# Patient Record
Sex: FEMALE | Race: BLACK OR AFRICAN AMERICAN | NOT HISPANIC OR LATINO | Employment: OTHER | URBAN - METROPOLITAN AREA
[De-identification: names, ages, dates, MRNs, and addresses within clinical notes are randomized per-mention and may not be internally consistent; named-entity substitution may affect disease eponyms.]

---

## 2021-05-07 ENCOUNTER — CLINICAL SUPPORT (OUTPATIENT)
Dept: URGENT CARE | Facility: CLINIC | Age: 71
End: 2021-05-07
Payer: MEDICARE

## 2021-05-07 DIAGNOSIS — Z11.52 ENCOUNTER FOR SCREENING LABORATORY TESTING FOR COVID-19 VIRUS: Primary | ICD-10-CM

## 2021-05-07 DIAGNOSIS — U07.1 COVID-19 VIRUS DETECTED: ICD-10-CM

## 2021-05-07 LAB
CTP QC/QA: YES
SARS-COV-2 RDRP RESP QL NAA+PROBE: POSITIVE

## 2021-05-07 PROCEDURE — U0002: ICD-10-PCS | Mod: QW,S$GLB,, | Performed by: PHYSICIAN ASSISTANT

## 2021-05-07 PROCEDURE — U0002 COVID-19 LAB TEST NON-CDC: HCPCS | Mod: QW,S$GLB,, | Performed by: PHYSICIAN ASSISTANT

## 2021-05-25 ENCOUNTER — CLINICAL SUPPORT (OUTPATIENT)
Dept: URGENT CARE | Facility: CLINIC | Age: 71
End: 2021-05-25
Payer: MEDICARE

## 2021-05-25 DIAGNOSIS — Z20.822 ENCOUNTER FOR LABORATORY TESTING FOR COVID-19 VIRUS: ICD-10-CM

## 2021-05-25 PROCEDURE — U0003 INFECTIOUS AGENT DETECTION BY NUCLEIC ACID (DNA OR RNA); SEVERE ACUTE RESPIRATORY SYNDROME CORONAVIRUS 2 (SARS-COV-2) (CORONAVIRUS DISEASE [COVID-19]), AMPLIFIED PROBE TECHNIQUE, MAKING USE OF HIGH THROUGHPUT TECHNOLOGIES AS DESCRIBED BY CMS-2020-01-R: HCPCS | Performed by: PHYSICIAN ASSISTANT

## 2021-05-25 PROCEDURE — U0005 INFEC AGEN DETEC AMPLI PROBE: HCPCS | Performed by: PHYSICIAN ASSISTANT

## 2021-05-26 ENCOUNTER — TELEPHONE (OUTPATIENT)
Dept: URGENT CARE | Facility: CLINIC | Age: 71
End: 2021-05-26

## 2021-05-26 LAB — SARS-COV-2 RNA RESP QL NAA+PROBE: DETECTED

## 2021-05-28 ENCOUNTER — PATIENT OUTREACH (OUTPATIENT)
Dept: INFECTIOUS DISEASES | Facility: HOSPITAL | Age: 71
End: 2021-05-28

## 2021-05-28 ENCOUNTER — NURSE TRIAGE (OUTPATIENT)
Dept: ADMINISTRATIVE | Facility: CLINIC | Age: 71
End: 2021-05-28

## 2024-08-20 ENCOUNTER — CLINICAL SUPPORT (OUTPATIENT)
Dept: CARDIAC REHAB | Facility: HOSPITAL | Age: 74
End: 2024-08-20
Attending: INTERNAL MEDICINE
Payer: MEDICARE

## 2024-08-20 DIAGNOSIS — J44.9 COPD (CHRONIC OBSTRUCTIVE PULMONARY DISEASE): Primary | ICD-10-CM

## 2024-08-20 PROCEDURE — 93797 PHYS/QHP OP CAR RHAB WO ECG: CPT

## 2024-10-03 ENCOUNTER — CLINICAL SUPPORT (OUTPATIENT)
Dept: CARDIAC REHAB | Facility: HOSPITAL | Age: 74
End: 2024-10-03
Payer: MEDICARE

## 2024-10-03 DIAGNOSIS — J44.9 COPD (CHRONIC OBSTRUCTIVE PULMONARY DISEASE): Primary | ICD-10-CM

## 2024-10-03 PROCEDURE — 94626 PHY/QHP OP PULM RHB W/MNTR: CPT

## 2025-03-23 ENCOUNTER — HOSPITAL ENCOUNTER (EMERGENCY)
Facility: HOSPITAL | Age: 75
Discharge: HOME | End: 2025-03-23
Attending: EMERGENCY MEDICINE
Payer: MEDICARE

## 2025-03-23 VITALS
TEMPERATURE: 98 F | DIASTOLIC BLOOD PRESSURE: 79 MMHG | OXYGEN SATURATION: 91 % | RESPIRATION RATE: 18 BRPM | WEIGHT: 117 LBS | SYSTOLIC BLOOD PRESSURE: 166 MMHG | BODY MASS INDEX: 20.73 KG/M2 | HEART RATE: 93 BPM | HEIGHT: 63 IN

## 2025-03-23 DIAGNOSIS — R31.9 HEMATURIA, UNSPECIFIED TYPE: ICD-10-CM

## 2025-03-23 DIAGNOSIS — J18.9 PNEUMONIA OF RIGHT MIDDLE LOBE DUE TO INFECTIOUS ORGANISM: ICD-10-CM

## 2025-03-23 DIAGNOSIS — R10.9 RIGHT FLANK PAIN: Primary | ICD-10-CM

## 2025-03-23 DIAGNOSIS — Z87.09 HISTORY OF COPD: ICD-10-CM

## 2025-03-23 DIAGNOSIS — Z87.09 HX OF INFLUENZA: ICD-10-CM

## 2025-03-23 LAB
ABSOLUTE EOSINOPHIL (SMH): 0.13 K/UL
ABSOLUTE MONOCYTE (SMH): 0.67 K/UL (ref 0.3–1)
ABSOLUTE NEUTROPHIL COUNT (SMH): 3.8 K/UL (ref 1.8–7.7)
ALBUMIN SERPL-MCNC: 3.3 G/DL (ref 3.5–5.2)
ALP SERPL-CCNC: 107 UNIT/L (ref 40–150)
ALT SERPL-CCNC: <8 UNIT/L (ref 10–44)
ANION GAP (SMH): 12 MMOL/L (ref 8–16)
AST SERPL-CCNC: 13 UNIT/L (ref 11–45)
BACTERIA #/AREA URNS AUTO: ABNORMAL /HPF
BASOPHILS # BLD AUTO: 0.02 K/UL
BASOPHILS NFR BLD AUTO: 0.3 %
BILIRUB SERPL-MCNC: 0.4 MG/DL (ref 0.1–1)
BILIRUB UR QL STRIP.AUTO: NEGATIVE
BUN SERPL-MCNC: 18 MG/DL (ref 8–23)
CALCIUM SERPL-MCNC: 9 MG/DL (ref 8.7–10.5)
CHLORIDE SERPL-SCNC: 107 MMOL/L (ref 95–110)
CLARITY UR: CLEAR
CO2 SERPL-SCNC: 23 MMOL/L (ref 23–29)
COLOR UR AUTO: YELLOW
CREAT SERPL-MCNC: 0.9 MG/DL (ref 0.5–1.4)
ERYTHROCYTE [DISTWIDTH] IN BLOOD BY AUTOMATED COUNT: 14.4 % (ref 11.5–14.5)
GFR SERPLBLD CREATININE-BSD FMLA CKD-EPI: >60 ML/MIN/1.73/M2
GLUCOSE SERPL-MCNC: 106 MG/DL (ref 70–110)
GLUCOSE UR QL STRIP: NEGATIVE
HCT VFR BLD AUTO: 38.5 % (ref 37–48.5)
HCV AB SERPL QL IA: NORMAL
HGB BLD-MCNC: 11.8 GM/DL (ref 12–16)
HGB UR QL STRIP: ABNORMAL
HIV 1+2 AB+HIV1 P24 AG SERPL QL IA: NORMAL
IMM GRANULOCYTES # BLD AUTO: 0.01 K/UL (ref 0–0.04)
IMM GRANULOCYTES NFR BLD AUTO: 0.2 % (ref 0–0.5)
KETONES UR QL STRIP: ABNORMAL
LACTATE SERPL-SCNC: 0.9 MMOL/L (ref 0.5–2.2)
LEUKOCYTE ESTERASE UR QL STRIP: ABNORMAL
LIPASE SERPL-CCNC: 41 U/L (ref 4–60)
LYMPHOCYTES # BLD AUTO: 1.63 K/UL (ref 1–4.8)
MCH RBC QN AUTO: 27.1 PG (ref 27–31)
MCHC RBC AUTO-ENTMCNC: 30.6 G/DL (ref 32–36)
MCV RBC AUTO: 89 FL (ref 82–98)
MICROSCOPIC COMMENT: ABNORMAL
NITRITE UR QL STRIP: NEGATIVE
NUCLEATED RBC (/100WBC) (SMH): 0 /100 WBC
PH UR STRIP: 6 [PH]
PLATELET # BLD AUTO: 484 K/UL (ref 150–450)
PMV BLD AUTO: 9.2 FL (ref 9.2–12.9)
POTASSIUM SERPL-SCNC: 3.2 MMOL/L (ref 3.5–5.1)
PROT SERPL-MCNC: 7.4 GM/DL (ref 6–8.4)
PROT UR QL STRIP: ABNORMAL
RBC # BLD AUTO: 4.35 M/UL (ref 4–5.4)
RBC #/AREA URNS AUTO: 84 /HPF
RELATIVE EOSINOPHIL (SMH): 2.1 % (ref 0–8)
RELATIVE LYMPHOCYTE (SMH): 26.1 % (ref 18–48)
RELATIVE MONOCYTE (SMH): 10.7 % (ref 4–15)
RELATIVE NEUTROPHIL (SMH): 60.6 % (ref 38–73)
SODIUM SERPL-SCNC: 142 MMOL/L (ref 136–145)
SP GR UR STRIP: 1.02
SQUAMOUS #/AREA URNS AUTO: 6 /HPF
UROBILINOGEN UR STRIP-ACNC: NEGATIVE EU/DL
WBC # BLD AUTO: 6.25 K/UL (ref 3.9–12.7)
WBC #/AREA URNS AUTO: 5 /HPF

## 2025-03-23 PROCEDURE — 83690 ASSAY OF LIPASE: CPT | Performed by: PHYSICIAN ASSISTANT

## 2025-03-23 PROCEDURE — 81001 URINALYSIS AUTO W/SCOPE: CPT | Performed by: PHYSICIAN ASSISTANT

## 2025-03-23 PROCEDURE — 63700000 PHARM REV CODE 250 ALT 637 W/O HCPCS: Performed by: PHYSICIAN ASSISTANT

## 2025-03-23 PROCEDURE — 80053 COMPREHEN METABOLIC PANEL: CPT | Performed by: PHYSICIAN ASSISTANT

## 2025-03-23 PROCEDURE — 94760 N-INVAS EAR/PLS OXIMETRY 1: CPT

## 2025-03-23 PROCEDURE — 36415 COLL VENOUS BLD VENIPUNCTURE: CPT | Performed by: PHYSICIAN ASSISTANT

## 2025-03-23 PROCEDURE — 86803 HEPATITIS C AB TEST: CPT | Performed by: EMERGENCY MEDICINE

## 2025-03-23 PROCEDURE — 99285 EMERGENCY DEPT VISIT HI MDM: CPT | Mod: 25

## 2025-03-23 PROCEDURE — 96375 TX/PRO/DX INJ NEW DRUG ADDON: CPT

## 2025-03-23 PROCEDURE — 25000242 PHARM REV CODE 250 ALT 637 W/ HCPCS: Performed by: PHYSICIAN ASSISTANT

## 2025-03-23 PROCEDURE — 85025 COMPLETE CBC W/AUTO DIFF WBC: CPT | Performed by: PHYSICIAN ASSISTANT

## 2025-03-23 PROCEDURE — 25000003 PHARM REV CODE 250: Performed by: PHYSICIAN ASSISTANT

## 2025-03-23 PROCEDURE — 83605 ASSAY OF LACTIC ACID: CPT | Performed by: PHYSICIAN ASSISTANT

## 2025-03-23 PROCEDURE — 94640 AIRWAY INHALATION TREATMENT: CPT

## 2025-03-23 PROCEDURE — 96361 HYDRATE IV INFUSION ADD-ON: CPT

## 2025-03-23 PROCEDURE — 87389 HIV-1 AG W/HIV-1&-2 AB AG IA: CPT | Performed by: EMERGENCY MEDICINE

## 2025-03-23 PROCEDURE — 63600175 PHARM REV CODE 636 W HCPCS: Mod: TB,JZ | Performed by: PHYSICIAN ASSISTANT

## 2025-03-23 PROCEDURE — 96374 THER/PROPH/DIAG INJ IV PUSH: CPT

## 2025-03-23 RX ORDER — AMOXICILLIN AND CLAVULANATE POTASSIUM 875; 125 MG/1; MG/1
1 TABLET, FILM COATED ORAL
Status: COMPLETED | OUTPATIENT
Start: 2025-03-23 | End: 2025-03-23

## 2025-03-23 RX ORDER — LIDOCAINE 50 MG/G
1 PATCH TOPICAL DAILY
Qty: 30 PATCH | Refills: 0 | Status: SHIPPED | OUTPATIENT
Start: 2025-03-23 | End: 2025-03-23

## 2025-03-23 RX ORDER — AZITHROMYCIN 250 MG/1
250 TABLET, FILM COATED ORAL DAILY
Qty: 6 TABLET | Refills: 0 | Status: SHIPPED | OUTPATIENT
Start: 2025-03-23

## 2025-03-23 RX ORDER — AZITHROMYCIN 250 MG/1
250 TABLET, FILM COATED ORAL DAILY
Qty: 6 TABLET | Refills: 0 | Status: SHIPPED | OUTPATIENT
Start: 2025-03-23 | End: 2025-03-23

## 2025-03-23 RX ORDER — LIDOCAINE 50 MG/G
1 PATCH TOPICAL DAILY
Qty: 30 PATCH | Refills: 0 | Status: SHIPPED | OUTPATIENT
Start: 2025-03-23

## 2025-03-23 RX ORDER — MORPHINE SULFATE 2 MG/ML
2 INJECTION, SOLUTION INTRAMUSCULAR; INTRAVENOUS
Refills: 0 | Status: COMPLETED | OUTPATIENT
Start: 2025-03-23 | End: 2025-03-23

## 2025-03-23 RX ORDER — ACETAMINOPHEN 500 MG
1000 TABLET ORAL
Status: COMPLETED | OUTPATIENT
Start: 2025-03-23 | End: 2025-03-23

## 2025-03-23 RX ORDER — KETOROLAC TROMETHAMINE 30 MG/ML
10 INJECTION, SOLUTION INTRAMUSCULAR; INTRAVENOUS
Status: COMPLETED | OUTPATIENT
Start: 2025-03-23 | End: 2025-03-23

## 2025-03-23 RX ORDER — ALBUTEROL SULFATE 90 UG/1
2 INHALANT RESPIRATORY (INHALATION) EVERY 8 HOURS
Status: DISCONTINUED | OUTPATIENT
Start: 2025-03-23 | End: 2025-03-23 | Stop reason: HOSPADM

## 2025-03-23 RX ORDER — AZITHROMYCIN 250 MG/1
500 TABLET, FILM COATED ORAL
Status: COMPLETED | OUTPATIENT
Start: 2025-03-23 | End: 2025-03-23

## 2025-03-23 RX ORDER — AMOXICILLIN AND CLAVULANATE POTASSIUM 875; 125 MG/1; MG/1
1 TABLET, FILM COATED ORAL 2 TIMES DAILY
Qty: 14 TABLET | Refills: 0 | Status: SHIPPED | OUTPATIENT
Start: 2025-03-23 | End: 2025-03-23

## 2025-03-23 RX ORDER — HYDROCODONE BITARTRATE AND ACETAMINOPHEN 5; 325 MG/1; MG/1
1 TABLET ORAL EVERY 6 HOURS PRN
Qty: 12 TABLET | Refills: 0 | Status: SHIPPED | OUTPATIENT
Start: 2025-03-23 | End: 2025-03-23

## 2025-03-23 RX ORDER — HYDROCODONE BITARTRATE AND ACETAMINOPHEN 5; 325 MG/1; MG/1
1 TABLET ORAL EVERY 6 HOURS PRN
Qty: 12 TABLET | Refills: 0 | Status: SHIPPED | OUTPATIENT
Start: 2025-03-23 | End: 2025-03-26

## 2025-03-23 RX ORDER — LIDOCAINE 50 MG/G
1 PATCH TOPICAL ONCE
Status: DISCONTINUED | OUTPATIENT
Start: 2025-03-23 | End: 2025-03-23 | Stop reason: HOSPADM

## 2025-03-23 RX ORDER — AMOXICILLIN AND CLAVULANATE POTASSIUM 875; 125 MG/1; MG/1
1 TABLET, FILM COATED ORAL 2 TIMES DAILY
Qty: 14 TABLET | Refills: 0 | Status: SHIPPED | OUTPATIENT
Start: 2025-03-23

## 2025-03-23 RX ADMIN — ALBUTEROL SULFATE 2 PUFF: 90 AEROSOL, METERED RESPIRATORY (INHALATION) at 07:03

## 2025-03-23 RX ADMIN — SODIUM CHLORIDE 500 ML: 9 INJECTION, SOLUTION INTRAVENOUS at 05:03

## 2025-03-23 RX ADMIN — LIDOCAINE 1 PATCH: 50 PATCH TOPICAL at 07:03

## 2025-03-23 RX ADMIN — KETOROLAC TROMETHAMINE 10 MG: 30 INJECTION, SOLUTION INTRAMUSCULAR at 05:03

## 2025-03-23 RX ADMIN — AMOXICILLIN AND CLAVULANATE POTASSIUM 1 TABLET: 875; 125 TABLET, FILM COATED ORAL at 07:03

## 2025-03-23 RX ADMIN — ACETAMINOPHEN 1000 MG: 500 TABLET ORAL at 07:03

## 2025-03-23 RX ADMIN — AZITHROMYCIN DIHYDRATE 500 MG: 250 TABLET ORAL at 07:03

## 2025-03-23 RX ADMIN — MORPHINE SULFATE 2 MG: 2 INJECTION, SOLUTION INTRAMUSCULAR; INTRAVENOUS at 07:03

## 2025-03-24 NOTE — PLAN OF CARE
03/24/25 1053   Discharge Reassessment   Assessment Type Discharge Planning Reassessment   Did the patient's condition or plan change since previous assessment? Yes   Post-Acute Status   Discharge Delays None known at this time     CM contacted [facility] for portable O2.

## 2025-03-24 NOTE — ED PROVIDER NOTES
Encounter Date: 3/23/2025       History     Chief Complaint   Patient presents with    Flank Pain     Right sided started 2 days ago      Rimma Reynolds is a 74 y.o. female presenting for evaluation of right sided flank pain, persisting for the last couple of days.  No injury, trauma or fall.  No dysuria or hematuria.  No fever, no chills.  She has a history of COPD and was recently diagnosed with influenza on March 18th.  She has been using oxygen at home as needed and has no increased shortness of breath or difficulty breathing.  Some mild associated right-sided abdominal pain as well.  She states she does have a history of renal stones.  No nausea, vomiting or diarrhea.  She has no past medical history on file.      The history is provided by the patient.     Review of patient's allergies indicates:  No Known Allergies  History reviewed. No pertinent past medical history.  History reviewed. No pertinent surgical history.  No family history on file.  Social History[1]  Review of Systems   Constitutional:  Negative for chills and fever.   Respiratory:  Positive for cough and wheezing (History of COPD). Negative for chest tightness and shortness of breath.    Cardiovascular:  Negative for chest pain and palpitations.   Gastrointestinal:  Positive for abdominal pain. Negative for diarrhea, nausea and vomiting.   Genitourinary:  Positive for flank pain. Negative for decreased urine volume and hematuria.   Musculoskeletal:  Negative for arthralgias, back pain, joint swelling, myalgias, neck pain and neck stiffness.   Skin:  Negative for color change, pallor, rash and wound.   Neurological:  Negative for weakness and numbness.   Hematological:  Does not bruise/bleed easily.       Physical Exam     Initial Vitals [03/23/25 1550]   BP Pulse Resp Temp SpO2   (!) 156/74 107 20 98.3 °F (36.8 °C) (!) 92 %      MAP       --         Physical Exam    Nursing note and vitals reviewed.  Constitutional: She is not diaphoretic. No  distress.   Elderly, but well-appearing.   HENT:   Head: Normocephalic and atraumatic.   Right Ear: External ear normal.   Left Ear: External ear normal.   Nose: Nose normal. Mouth/Throat: Oropharynx is clear and moist.   Eyes: Conjunctivae and EOM are normal. Pupils are equal, round, and reactive to light.   Neck: Neck supple.   Normal range of motion.  Cardiovascular:  Normal rate, regular rhythm, normal heart sounds and intact distal pulses.           Pulmonary/Chest: No respiratory distress. She has wheezes. She has no rhonchi. She has no rales.   Expiratory wheezing noted bilaterally.  No respiratory distress.  No tachypnea.   Abdominal: Abdomen is soft. She exhibits no distension and no mass. There is abdominal tenderness.   Mild tenderness to palpation noted to right lower abdomen and suprapubic region.  No CVA tenderness.   Musculoskeletal:         General: No tenderness or edema. Normal range of motion.      Cervical back: Normal range of motion and neck supple.      Comments: No reproducible tenderness to palpation noted to midline lumbar spinous processes.     Neurological: She is alert and oriented to person, place, and time. She has normal strength. No sensory deficit.   Skin: Skin is warm and dry. No rash and no abscess noted. No erythema.   Psychiatric: She has a normal mood and affect.         ED Course   Procedures  Labs Reviewed   COMPREHENSIVE METABOLIC PANEL - Abnormal       Result Value    Sodium 142      Potassium 3.2 (*)     Chloride 107      CO2 23      Glucose 106      BUN 18      Creatinine 0.9      Calcium 9.0      Protein Total 7.4      Albumin 3.3 (*)     Bilirubin Total 0.4            AST 13      ALT <8 (*)     Anion Gap 12      eGFR >60     URINALYSIS, REFLEX TO URINE CULTURE - Abnormal    Color, UA Yellow      Appearance, UA Clear      Spec Grav UA 1.025      pH, UA 6.0      Protein, UA 2+ (*)     Glucose, UA Negative      Ketones, UA Trace (*)     Blood, UA 2+ (*)      Bilirubin, UA Negative      Urobilinogen, UA Negative      Nitrites, UA Negative      Leukocyte Esterase, UA 1+ (*)    CBC WITH DIFFERENTIAL - Abnormal    WBC 6.25      RBC 4.35      Hgb 11.8 (*)     Hct 38.5      MCV 89      MCH 27.1      MCHC 30.6 (*)     RDW 14.4      Platelet Count 484 (*)     MPV 9.2      Nucleated RBC 0      Neut % 60.6      Lymph % 26.1      Mono % 10.7      Eos % 2.1      Basophil % 0.3      Imm Grans % 0.2      Neut # 3.8      Lymph # 1.63      Mono # 0.67      Eos # 0.13      Baso # 0.02      Imm Grans # 0.01     URINALYSIS MICROSCOPIC - Abnormal    RBC, UA 84 (*)     WBC, UA 5      Bacteria, UA Few (*)     Squamous Epithelial Cells, UA 6      Microscopic Comment       HEPATITIS C ANTIBODY - Normal    Hep C Ab Interp Non-Reactive     HIV 1 / 2 ANTIBODY - Normal    HIV 1/2 Ag/Ab Non-Reactive     LIPASE - Normal    Lipase Level 41     LACTIC ACID, PLASMA - Normal    Lactic Acid Level 0.9      Narrative:     Falsely low lactic acid results can be found in samples containing >=13.0 mg/dL total bilirubin and/or >=3.5 mg/dL direct bilirubin.    CBC W/ AUTO DIFFERENTIAL    Narrative:     The following orders were created for panel order CBC auto differential.  Procedure                               Abnormality         Status                     ---------                               -----------         ------                     CBC with Differential[[id number]]       Abnormal            Final result                 Please view results for these tests on the individual orders.          Imaging Results              X-Ray Chest PA And Lateral (Final result)  Result time 03/23/25 17:05:53      Final result by Nilton Mack Jr., MD (03/23/25 17:05:53)                   Impression:      Band of scar identified in the right middle lobe.  Correlation with the prior chest x-rays is recommended to assure stability.  Calcification is noted in the aorta.      Electronically signed by: Nilton  MD Martina  Date:    03/23/2025  Time:    17:05               Narrative:    EXAMINATION:  XR CHEST PA AND LATERAL    CLINICAL HISTORY:  Personal history of other diseases of the respiratory system    TECHNIQUE:  PA and lateral views of the chest were performed.    COMPARISON:  Prior studies are not available for comparison.  There are reports of prior chest x-rays and chest CTs    FINDINGS:  Calcification is noted in the aorta.  The cardiac size and contours within normal limits.  A band of scarring is identified in the right middle lobe.  The left lung is clear.  No pneumothorax or pleural effusion is noted.                                       CT Renal Stone Study ABD Pelvis WO (Final result)  Result time 03/23/25 17:03:05      Final result by Claude Ruiz DO (03/23/25 17:03:05)                   Impression:      1. No acute abnormality of the abdomen or pelvis.  2. Nonobstructing right sided nephrolithiasis as above.  No hydronephrosis.  3. Aortic aneurysm at the diaphragmatic hiatus measuring 3.4 cm.      Electronically signed by: Claude Ruiz  Date:    03/23/2025  Time:    17:03               Narrative:    EXAMINATION:  CT RENAL STONE STUDY ABD PELVIS WO    CLINICAL HISTORY:  Flank pain, kidney stone suspected;    TECHNIQUE:  Multiplanar images were obtained of the abdomen and pelvis from the hemidiaphragms through the symphysis pubis without intravenous contrast.    COMPARISON:  None    FINDINGS:  Lung Bases: There is atelectasis in the right lower lobe and the right middle lobe.    Heart: Heart size is normal.  No pericardial effusion.    Liver: Normal in size and attenuation without focal hepatic lesion.    Biliary tract: No intrahepatic or extrahepatic biliary ductal dilatation.    Gallbladder: The gallbladder is not seen, may be atrophic or absent.    Pancreas: Normal. No pancreatic ductal dilatation.    Spleen: Normal size without focal lesion.    Adrenals: Normal.    Kidneys and urinary  collecting systems: There are several nonobstructing right renal calculi, the largest of which measures 9 mm.  There is no evidence of hydronephrosis or obstructing ureteral stone.    Lymph nodes: None enlarged.    Stomach and bowel: The stomach is normal.  Loops of small and large bowel are normal in caliber without evidence for inflammation or obstruction.  The appendix is not definitely seen however there is no evidence of right lower quadrant inflammation.    Peritoneum and mesentery: No ascites or free intraperitoneal air. No abdominal fluid collection.    Vasculature: There is an aneurysm of the descending thoracic aorta at the diaphragmatic hiatus measuring 3.4 x 3.4 cm.  There is moderate atherosclerotic disease.    Urinary bladder: Normal.    Reproductive organs: Several small uterine fibroids are noted.  The uterus and adnexae are otherwise unremarkable.    Body wall: No abnormality.    Musculoskeletal: No aggressive osseous lesion.  There are degenerative changes of the spine.                                       Medications   LIDOcaine 5 % patch 1 patch (1 patch Transdermal Patch Applied 3/23/25 1909)   albuterol inhaler 2 puff (2 puffs Inhalation Given 3/23/25 1950)   sodium chloride 0.9% bolus 500 mL 500 mL (0 mLs Intravenous Stopped 3/23/25 1802)   ketorolac injection 9.999 mg (9.999 mg Intravenous Given 3/23/25 1702)   morphine injection 2 mg (2 mg Intravenous Given 3/23/25 1909)   acetaminophen tablet 1,000 mg (1,000 mg Oral Given 3/23/25 1908)   amoxicillin-clavulanate 875-125mg per tablet 1 tablet (1 tablet Oral Given 3/23/25 1908)   azithromycin tablet 500 mg (500 mg Oral Given 3/23/25 1908)     Medical Decision Making  Differential diagnosis:  Pneumonia  COPD exacerbation  Renal stone  Pyelonephritis  UTI    Pt emergently evaluated here in the ED.    Patient is afebrile here in the emergency department.  History of COPD.  Chest x-ray shows evidence for right lung scarring versus atelectasis and  given her history of recent influenza with associated COPD, we will treat with antibiotics in case there is an infectious component. Hypoxia here in the ED is her baseline.  Urinalysis does show evidence for hematuria and few bacteria.  Labs stable without leukocytosis or abnormal renal function.  Lactic acid is negative.  CT renal stone study shows no evidence for acute intra-abdominal processes; however, there is evidence for several, nonobstructing right intrarenal stones, largest measuring 9 mm.  No hydronephrosis or other obstructing ureteral stone.  She is made aware of the findings.  She is given a dose of Augmentin and azithromycin here in the emergency department.  The Augmentin should cover any underlying infection in the urine too. She will be discharged home to follow-up with her PCP for re-evaluation and further management.  Patient voices understanding and is agreeable to the plan.  Specific return precautions are given.  Patient is given a referral to Urology to follow up with the hematuria and intrarenal stones.      Amount and/or Complexity of Data Reviewed  Labs: ordered. Decision-making details documented in ED Course.  Radiology: ordered. Decision-making details documented in ED Course.    Risk  OTC drugs.  Prescription drug management.                                      Clinical Impression:  Final diagnoses:  [Z87.09] Hx of influenza  [R10.9] Right flank pain (Primary)  [R31.9] Hematuria, unspecified type  [Z87.09] History of COPD  [J18.9] Pneumonia of right middle lobe due to infectious organism          ED Disposition Condition    Discharge Stable          ED Prescriptions       Medication Sig Dispense Start Date End Date Auth. Provider    amoxicillin-clavulanate 875-125mg (AUGMENTIN) 875-125 mg per tablet  (Status: Discontinued) Take 1 tablet by mouth 2 (two) times daily. 14 tablet 3/23/2025 3/23/2025 Teresa Casillas PA-C    azithromycin (Z-URSULA) 250 MG tablet  (Status: Discontinued)  Take 1 tablet (250 mg total) by mouth once daily. Take first 2 tablets together, then 1 every day until finished. 6 tablet 3/23/2025 3/23/2025 Teresa Casillas PA-C    HYDROcodone-acetaminophen (NORCO) 5-325 mg per tablet  (Status: Discontinued) Take 1 tablet by mouth every 6 (six) hours as needed for Pain. 12 tablet 3/23/2025 3/23/2025 Teresa Casillas PA-C    LIDOcaine (LIDODERM) 5 %  (Status: Discontinued) Place 1 patch onto the skin once daily. Remove & Discard patch within 12 hours or as directed by MD Contreras patch 3/23/2025 3/23/2025 Teresa Caslilas PA-C    amoxicillin-clavulanate 875-125mg (AUGMENTIN) 875-125 mg per tablet Take 1 tablet by mouth 2 (two) times daily. 14 tablet 3/23/2025 -- Teresa Casillas PA-C    azithromycin (Z-URSULA) 250 MG tablet Take 1 tablet (250 mg total) by mouth once daily. Take first 2 tablets together, then 1 every day until finished. 6 tablet 3/23/2025 -- Teresa Casillas PA-C    HYDROcodone-acetaminophen (NORCO) 5-325 mg per tablet Take 1 tablet by mouth every 6 (six) hours as needed for Pain. 12 tablet 3/23/2025 3/26/2025 Teresa Casillas PA-C    LIDOcaine (LIDODERM) 5 % Place 1 patch onto the skin once daily. Remove & Discard patch within 12 hours or as directed by MD Contreras patch 3/23/2025 -- Teresa Casillas PA-C          Follow-up Information       Follow up With Specialties Details Why Contact Info Additional Information    [facility]  As needed, If symptoms worsen [address] [place] [address] Dr Connor Mccarthy [phone] 1st floor               [1]   Social History  Tobacco Use    Smoking status: Unknown        Teresa Casillas PA-C  03/23/25 2036

## 2025-04-05 ENCOUNTER — CLINICAL SUPPORT (OUTPATIENT)
Dept: CARDIOLOGY | Facility: HOSPITAL | Age: 75
End: 2025-04-05
Payer: MEDICARE

## 2025-04-05 PROBLEM — J44.9 COPD (CHRONIC OBSTRUCTIVE PULMONARY DISEASE): Status: ACTIVE | Noted: 2025-04-05

## 2025-04-05 PROBLEM — J93.11 PRIMARY SPONTANEOUS PNEUMOTHORAX: Status: ACTIVE | Noted: 2025-04-05

## 2025-04-05 PROBLEM — E87.6 HYPOKALEMIA: Status: ACTIVE | Noted: 2025-04-05

## 2025-04-05 PROBLEM — E11.9 TYPE 2 DIABETES MELLITUS: Status: ACTIVE | Noted: 2025-04-05

## 2025-04-05 PROCEDURE — 93306 TTE W/DOPPLER COMPLETE: CPT | Mod: 26,,, | Performed by: INTERNAL MEDICINE

## 2025-04-05 PROCEDURE — 93306 TTE W/DOPPLER COMPLETE: CPT

## 2025-04-10 PROBLEM — Z71.89 ACP (ADVANCE CARE PLANNING): Status: ACTIVE | Noted: 2025-04-10

## 2025-04-15 ENCOUNTER — ANESTHESIA EVENT (OUTPATIENT)
Dept: CARDIOLOGY | Facility: HOSPITAL | Age: 75
End: 2025-04-15
Payer: MEDICARE

## 2025-04-15 ENCOUNTER — CLINICAL SUPPORT (OUTPATIENT)
Dept: CARDIOLOGY | Facility: HOSPITAL | Age: 75
End: 2025-04-15
Attending: EMERGENCY MEDICINE
Payer: MEDICARE

## 2025-04-15 ENCOUNTER — ANESTHESIA (OUTPATIENT)
Dept: CARDIOLOGY | Facility: HOSPITAL | Age: 75
End: 2025-04-15
Payer: MEDICARE

## 2025-04-15 VITALS
OXYGEN SATURATION: 100 % | HEART RATE: 77 BPM | RESPIRATION RATE: 20 BRPM | SYSTOLIC BLOOD PRESSURE: 120 MMHG | DIASTOLIC BLOOD PRESSURE: 64 MMHG

## 2025-04-15 PROCEDURE — 93325 DOPPLER ECHO COLOR FLOW MAPG: CPT | Mod: 26,,, | Performed by: STUDENT IN AN ORGANIZED HEALTH CARE EDUCATION/TRAINING PROGRAM

## 2025-04-15 PROCEDURE — 63600175 PHARM REV CODE 636 W HCPCS

## 2025-04-15 PROCEDURE — 93312 ECHO TRANSESOPHAGEAL: CPT | Mod: 26,,, | Performed by: STUDENT IN AN ORGANIZED HEALTH CARE EDUCATION/TRAINING PROGRAM

## 2025-04-15 PROCEDURE — 93325 DOPPLER ECHO COLOR FLOW MAPG: CPT

## 2025-04-15 PROCEDURE — 25000003 PHARM REV CODE 250

## 2025-04-15 PROCEDURE — 93320 DOPPLER ECHO COMPLETE: CPT | Mod: 26,,, | Performed by: STUDENT IN AN ORGANIZED HEALTH CARE EDUCATION/TRAINING PROGRAM

## 2025-04-15 RX ORDER — PHENYLEPHRINE HYDROCHLORIDE 10 MG/ML
INJECTION INTRAVENOUS
Status: DISCONTINUED | OUTPATIENT
Start: 2025-04-15 | End: 2025-04-15

## 2025-04-15 RX ORDER — PROPOFOL 10 MG/ML
VIAL (ML) INTRAVENOUS
Status: DISCONTINUED | OUTPATIENT
Start: 2025-04-15 | End: 2025-04-15

## 2025-04-15 RX ADMIN — PROPOFOL 50 MG: 10 INJECTION, EMULSION INTRAVENOUS at 09:04

## 2025-04-15 RX ADMIN — SODIUM CHLORIDE: 0.9 INJECTION, SOLUTION INTRAVENOUS at 09:04

## 2025-04-15 RX ADMIN — PHENYLEPHRINE HYDROCHLORIDE 100 MCG: 10 INJECTION INTRAVENOUS at 09:04

## 2025-04-15 NOTE — ANESTHESIA POSTPROCEDURE EVALUATION
Anesthesia Post Evaluation    Patient: Rimma Reynolds    Procedure(s) Performed: Procedure(s) (LRB):  Transesophageal echo (ABHISHEK) intra-procedure log documentation (N/A)    Final Anesthesia Type: general      Patient location: [facility].  Patient participation: Yes- Able to Participate  Level of consciousness: awake and alert, oriented and awake  Post-procedure vital signs: reviewed and stable  Pain management: adequate  Airway patency: patent    PONV status at discharge: No PONV  Anesthetic complications: no      Cardiovascular status: blood pressure returned to baseline, hemodynamically stable and stable  Respiratory status: unassisted, spontaneous ventilation and nasal cannula  Hydration status: euvolemic  Follow-up not needed.              Vitals Value Taken Time   /61 04/15/25 09:40   Temp 36.7 °C (98.1 °F) 04/15/25 09:26   Pulse 88 04/15/25 09:41   Resp 23 04/15/25 09:41   SpO2 100 % 04/15/25 09:41   Vitals shown include unfiled device data.      No case tracking events are documented in the log.      Pain/Jaycee Score: Jaycee Score: 10 (4/15/2025  8:27 AM)

## 2025-04-15 NOTE — TRANSFER OF CARE
"Anesthesia Transfer of Care Note    Patient: Rimma Reynolds    Procedure(s) Performed: Procedure(s) (LRB):  Transesophageal echo (ABHISHEK) intra-procedure log documentation (N/A)    Patient location: Other: [facility] center    Anesthesia Type: general    Transport from OR: Transported from OR on room air with adequate spontaneous ventilation    Post pain: adequate analgesia    Post assessment: no apparent anesthetic complications    Post vital signs: stable    Level of consciousness: awake and alert    Nausea/Vomiting: no nausea/vomiting    Complications: none    Transfer of care protocol was followed      Last vitals: Visit Vitals  BP (!) 146/74   Pulse 88   Temp 36.6 °C (97.8 °F)   Resp 16   Ht 5' 3" (1.6 m)   Wt 52.6 kg (115 lb 15.4 oz)   SpO2 95%   BMI 20.54 kg/m²     "

## 2025-04-15 NOTE — ANESTHESIA PREPROCEDURE EVALUATION
04/15/2025  Rimma Reynolds is a 74 y.o., female.      Results for orders placed or performed during the hospital encounter of 04/05/25   EKG 12-lead    Collection Time: 04/06/25  7:18 AM   Result Value Ref Range    QRS Duration 104 ms    OHS QTC Calculation 524 ms    Narrative    Test Reason : I21.4,    Vent. Rate :  84 BPM     Atrial Rate :  84 BPM     P-R Int : 178 ms          QRS Dur : 104 ms      QT Int : 444 ms       P-R-T Axes :  85 -54 -89 degrees    QTcB Int : 524 ms    Normal sinus rhythm  Left axis deviation  Inferior infarct ,age undetermined  T wave abnormality, consider lateral ischemia  Prolonged QT  Abnormal ECG  When compared with ECG of 05-Apr-2025 06:44,  Incomplete right bundle branch block is no longer Present  Criteria for Septal infarct are no longer Present  Inferior infarct is now Present  Confirmed by Dimitris Tsang ([id number]) on 4/13/2025 7:54:22 PM    Referred By: [email or web address] SELF           Confirmed By: Dimitris Tsang        Imaging Results              X-Ray Chest AP Portable (Final result)  Result time 04/05/25 10:16:58      Final result by Aidan Martin DO (04/05/25 10:16:58)                   Impression:      1. Interval placement of left chest tube with decrease in size of pneumothorax.  2. Re-expansion of right upper lobe with persistent lobar atelectasis of the right middle and right lower lobe.      Electronically signed by: Aidan Martin  Date:    04/05/2025  Time:    10:16               Narrative:    EXAMINATION:  XR CHEST AP PORTABLE    CLINICAL HISTORY:  Encounter for fitting and adjustment of non-vascular catheter    FINDINGS:  Portable chest with comparison chest x-ray 04/05/2025.  Interval placement of right chest tube.  Decrease in size of large right pneumothorax with interval expansion of the right upper lobe.Persistent lobar atelectasis of the right middle lobe  and right lower lobe.  Pulmonary vasculature is normal. No acute osseous abnormality.                                       CTA Chest Non-Coronary (PE Studies) (Final result)  Result time 04/05/25 08:43:46      Final result by Aidan Martin DO (04/05/25 08:43:46)                   Impression:      No pulmonary embolism.    Large right pneumothorax with lobar atelectasis of the right upper, right middle and right lower lobes..  No leftward shift mediastinal structures to indicate a tension pneumothorax.      Electronically signed by: Aidan Martin  Date:    04/05/2025  Time:    08:43               Narrative:      CMS MANDATED QUALITY DATA - CT RADIATION - 436    All CT scans at this facility utilize dose modulation, iterative reconstruction, and/or weight based dosing when appropriate to reduce radiation dose to as low as reasonably achievable.    EXAMINATION:  CTA CHEST NON CORONARY (PE STUDIES)    CLINICAL HISTORY:  Respiratory distress, chest pain;    TECHNIQUE:  CT angiography of the chest with 100 mL Omnipaque 350. Maximum intensity projection coronal reformations were created at a separate workstation and stored in the patient's permanent medical record.    COMPARISON:  None    FINDINGS:  No pulmonary embolism identified.  The heart is normal size.  The thoracic aorta is normal caliber with no calcified plaque formations.    Large right pneumothorax again observed with lobar atelectasis of the right upper, right middle right lower lobes.  There is centrilobular emphysematous lung disease of the left lung.  The left lung is otherwise clear.  The visualized abdominal viscera unremarkable.  No acute osseous abnormality.                                       X-Ray Chest AP Portable (Final result)  Result time 04/05/25 07:09:50      Final result by Aidan Martin DO (04/05/25 07:09:50)                   Impression:      Large right pneumothorax with lobar atelectasis of the right lung.  No mediastinal midline  shift.  Findings discussed with ER physician Dr. Tay Cohen at 07:09.      Electronically signed by: Aidan Martin  Date:    04/05/2025  Time:    07:09               Narrative:    EXAMINATION:  XR CHEST AP PORTABLE    CLINICAL HISTORY:  Sepsis;    FINDINGS:  Portable chest with comparison chest x-ray 03/23/2025.  Normal cardiomediastinal silhouette.Large right pneumothorax noted with lobar atelectasis of the right upper, right middle and right lower lobes.  No midline shift mediastinal structures.  Left lung is clear.  Pulmonary vasculature is normal. No acute osseous abnormality.                                       Lab Results   Component Value Date    WBC 8.45 04/15/2025    HGB 9.8 (L) 04/15/2025    HCT 31.8 (L) 04/15/2025    MCV 87 04/15/2025     04/15/2025     BMP  Lab Results   Component Value Date     04/15/2025    K 4.3 04/15/2025    CO2 30 (H) 04/15/2025    BUN 36 (H) 04/15/2025    CREATININE 0.9 04/15/2025    CALCIUM 8.9 04/15/2025       Results for orders placed during the hospital encounter of 04/05/25    Echo    Interpretation Summary    Left Ventricle: The left ventricle is normal in size. Normal wall thickness. Regional wall motion abnormalities present. See diagram for wall motion findings. There is mild to moderately reduced systolic function with a visually estimated ejection fraction of 40%. Grade I diastolic dysfunction.    Right Ventricle: The right ventricle is normal in size. Systolic function is normal.    Aortic Valve: The aortic valve is a trileaflet valve. There is aortic valve sclerosis.    Mitral Valve: There is mild posterior mitral annular calcification. Small mobile echodensity visualized on posterior leaflet.    IVC/SVC: Elevated venous pressure at 15 mmHg.         Pre-op Assessment    I have reviewed the Patient Summary Reports.     I have reviewed the Nursing Notes. I have reviewed the NPO Status.   I have reviewed the Medications.     Review of  Systems  Anesthesia Hx:  No problems with previous Anesthesia             Denies Family Hx of Anesthesia complications.    Denies Personal Hx of Anesthesia complications.                    Hematology/Oncology:       -- Anemia:               Hematology Comments: Aspirin therapy    Current/Recent Cancer.         radiation   Oncology Comments: Lung cancer     Cardiovascular:       Past MI CAD  asymptomatic     Denies Angina.        ECG has been reviewed. Occasional PVCs                           Pulmonary:  Pneumonia COPD      Pneumonia 2 weeks ago  Lung cancer  Albuterol inhaler use  DuoNeb therapy  Spiriva use  Symbicort use  Spontaneous pneumothorax with chest tube in place 4/5/2025 - discontinued 4/13/2025  Oxygen saturation 94% 3 L oxygen per nasal cannula   Chronic Obstructive Pulmonary Disease (COPD):   Emphysema           Chest Tumor/Mass:   right, lower lobe.  Lung Cancer         Renal/:   renal calculi               Hepatic/GI:  Hepatic/GI Normal                    Musculoskeletal:  Musculoskeletal Normal                Neurological:  Neurology Normal                                      Endocrine:  Diabetes, well controlled, type 2           Dermatological:  Skin Normal    Psych:  Psychiatric Normal                    Physical Exam  General: Cooperative, Alert and Oriented    Airway:  Mallampati: III   Mouth Opening: Normal  TM Distance: > 6 cm  Tongue: Normal  Neck ROM: Normal ROM    Dental:  Partial Dentures, Periodontal disease, Caps / Implants    Chest/Lungs:  Clear to auscultation, Tachypnea  Decreased Breath Sounds: right    Heart:  Rate: Normal  Rhythm: Occasional Prematures, Regular Rhythm        Anesthesia Plan  Type of Anesthesia, risks & benefits discussed:    Anesthesia Type: Gen Natural Airway  Intra-op Monitoring Plan: Standard ASA Monitors  Post Op Pain Control Plan:   (medical reason for not using multimodal pain management)  Induction:  IV  Informed Consent: Informed consent signed with  the Patient and all parties understand the risks and agree with anesthesia plan.  All questions answered.   ASA Score: 4  Anesthesia Plan Notes:         GNA  POM  Propofol     Ready For Surgery From Anesthesia Perspective.     .